# Patient Record
Sex: FEMALE | Race: WHITE | NOT HISPANIC OR LATINO | Employment: UNEMPLOYED | URBAN - METROPOLITAN AREA
[De-identification: names, ages, dates, MRNs, and addresses within clinical notes are randomized per-mention and may not be internally consistent; named-entity substitution may affect disease eponyms.]

---

## 2017-02-22 ENCOUNTER — HOSPITAL ENCOUNTER (EMERGENCY)
Facility: HOSPITAL | Age: 32
Discharge: HOME | End: 2017-02-22
Attending: EMERGENCY MEDICINE | Admitting: EMERGENCY MEDICINE

## 2017-02-22 VITALS
SYSTOLIC BLOOD PRESSURE: 126 MMHG | HEIGHT: 61 IN | RESPIRATION RATE: 16 BRPM | WEIGHT: 170 LBS | OXYGEN SATURATION: 99 % | TEMPERATURE: 98.5 F | DIASTOLIC BLOOD PRESSURE: 86 MMHG | HEART RATE: 91 BPM | BODY MASS INDEX: 32.1 KG/M2

## 2017-02-22 DIAGNOSIS — L02.01 ABSCESS, EYEBROW: Primary | ICD-10-CM

## 2017-02-22 PROCEDURE — 99283 EMERGENCY DEPT VISIT LOW MDM: CPT

## 2017-02-22 PROCEDURE — 99282 EMERGENCY DEPT VISIT SF MDM: CPT | Performed by: EMERGENCY MEDICINE

## 2017-02-22 RX ORDER — CLINDAMYCIN HYDROCHLORIDE 150 MG/1
300 CAPSULE ORAL ONCE
Status: COMPLETED | OUTPATIENT
Start: 2017-02-22 | End: 2017-02-22

## 2017-02-22 RX ORDER — CLINDAMYCIN HYDROCHLORIDE 300 MG/1
300 CAPSULE ORAL 3 TIMES DAILY
Qty: 21 CAPSULE | Refills: 0 | Status: SHIPPED | OUTPATIENT
Start: 2017-02-22 | End: 2017-03-01

## 2017-02-22 RX ADMIN — CLINDAMYCIN HYDROCHLORIDE 300 MG: 150 CAPSULE ORAL at 20:03

## 2017-02-23 NOTE — ED PROVIDER NOTES
"Subjective   History of Present Illness  History of Present Illness    Chief complaint: Eyebrow swelling, pain    Location: Right eyebrow    Quality/Severity:  Moderate pain and swelling    Timing/Duration: Began earlier this morning, worsening today    Modifying Factors: None    Narrative: Patient presents for evaluation of right eyebrow pain and swelling with some change in her vision to the right eye.  She denies any fevers.  She says that the swelling and tenderness began this morning and have progressed today.  She notes that she does groom her eyebrows with occasional blocking.  She has not had any swelling or facial infections like this in the past.  She and her family members were referred that it might be a \"cyst.\"  They have not taken any medications for the symptoms so far.  She says there is no pain within the eyeball itself.    Associated Symptoms: As above    Review of Systems   Constitutional: Negative for activity change and fever.   HENT: Negative.    Eyes: Positive for visual disturbance. Negative for pain.   Respiratory: Negative for cough and shortness of breath.    Cardiovascular: Negative for chest pain.   Gastrointestinal: Negative for abdominal pain.   Genitourinary: Negative for dysuria.   Skin: Positive for color change (above the right eye only).   Neurological: Negative for syncope and headaches.   All other systems reviewed and are negative.      Past Medical History   Diagnosis Date   • Anxiety    • Chronic back pain    • Depression        Allergies   Allergen Reactions   • Nickel    • Vyvanse [Lisdexamfetamine Dimesylate]        Past Surgical History   Procedure Laterality Date   • Tonsillectomy     • Adenoidectomy     • [place] tooth extraction     • Lumbar epidural injection     • Knee surgery         No family history on file.    Social History     Social History   • Marital status:      Spouse name: N/A   • Number of children: N/A   • Years of education: N/A     Social " History Main Topics   • Smoking status: Former Smoker   • Smokeless tobacco: None      Comment: pt states she uses e-cigs/vapor   • Alcohol use 1.2 oz/week     2 Glasses of wine per week   • Drug use: Yes     Special: Marijuana      Comment: pt states last smoke marijuana several weeks ago   • Sexual activity: Defer     Other Topics Concern   • None     Social History Narrative       ED Triage Vitals   Temp Heart Rate Resp BP SpO2   02/22/17 1811 02/22/17 1811 02/22/17 1811 02/22/17 1811 02/22/17 1811   98.5 °F (36.9 °C) 91 16 126/86 99 %      Temp src Heart Rate Source Patient Position BP Location FiO2 (%)   -- -- -- -- --                Objective   Physical Exam   Constitutional: She is oriented to person, place, and time. She appears well-developed and well-nourished.   HENT:   Head: Normocephalic and atraumatic.   Eyes: EOM are normal. Pupils are equal, round, and reactive to light. Right eye exhibits no discharge. Left eye exhibits no discharge.   The right mid eyebrow shows an early abscess formation with induration and erythema approximately 2 cm wide and very tender to palpation.  There are no open lesions.  There is no drainage.  The eye itself is completely normal in appearance without any inflammatory changes.  Extracting muscles are entirely intact in all directions.  Visual acuities appears normal bilaterally.   Neck: Normal range of motion. Neck supple.   Cardiovascular: Normal rate and regular rhythm.    Pulmonary/Chest: Effort normal. No respiratory distress.   Musculoskeletal: Normal range of motion. She exhibits no edema or deformity.   Neurological: She is alert and oriented to person, place, and time.   Skin: Skin is warm and dry. No rash noted. No erythema.   Psychiatric: She has a normal mood and affect. Her behavior is normal. Judgment and thought content normal.   Nursing note and vitals reviewed.      Procedures         ED Course  ED Course                  MDM    Final diagnoses:   Abscess,  boo Greene MD  02/25/17 0811

## 2017-02-23 NOTE — DISCHARGE INSTRUCTIONS
Please return to the emergency room for any worsening pain, fevers, swelling, drainage, vision changes or any other concerns.

## 2017-03-29 ENCOUNTER — APPOINTMENT (OUTPATIENT)
Dept: GENERAL RADIOLOGY | Facility: HOSPITAL | Age: 32
End: 2017-03-29

## 2017-03-29 ENCOUNTER — HOSPITAL ENCOUNTER (EMERGENCY)
Facility: HOSPITAL | Age: 32
Discharge: HOME | End: 2017-03-29
Attending: EMERGENCY MEDICINE | Admitting: EMERGENCY MEDICINE

## 2017-03-29 VITALS
HEART RATE: 90 BPM | SYSTOLIC BLOOD PRESSURE: 134 MMHG | TEMPERATURE: 98.1 F | HEIGHT: 62 IN | OXYGEN SATURATION: 100 % | RESPIRATION RATE: 18 BRPM | DIASTOLIC BLOOD PRESSURE: 87 MMHG | WEIGHT: 190 LBS | BODY MASS INDEX: 34.96 KG/M2

## 2017-03-29 DIAGNOSIS — M25.561 ACUTE PAIN OF BOTH KNEES: Primary | ICD-10-CM

## 2017-03-29 DIAGNOSIS — M25.562 ACUTE PAIN OF BOTH KNEES: Primary | ICD-10-CM

## 2017-03-29 PROCEDURE — 73560 X-RAY EXAM OF KNEE 1 OR 2: CPT

## 2017-03-29 PROCEDURE — 99282 EMERGENCY DEPT VISIT SF MDM: CPT | Performed by: EMERGENCY MEDICINE

## 2017-03-29 PROCEDURE — 99283 EMERGENCY DEPT VISIT LOW MDM: CPT

## 2017-03-29 RX ORDER — NAPROXEN 500 MG/1
500 TABLET ORAL 2 TIMES DAILY PRN
Qty: 20 TABLET | Refills: 0 | Status: SHIPPED | OUTPATIENT
Start: 2017-03-29 | End: 2021-03-08

## 2017-03-29 RX ORDER — GABAPENTIN 800 MG/1
800 TABLET ORAL 3 TIMES DAILY
COMMUNITY
End: 2021-03-08
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
## 2017-03-29 NOTE — DISCHARGE INSTRUCTIONS
Rest, ice, elevate your knees as needed.  Please return to the emergency room for any worsening pain, swelling, weakness, numbness or any other concerns.

## 2021-01-19 ENCOUNTER — TELEPHONE (OUTPATIENT)
Dept: INTERNAL MEDICINE | Facility: CLINIC | Age: 36
End: 2021-01-19

## 2021-01-19 DIAGNOSIS — B85.2 LICE: Primary | ICD-10-CM

## 2021-01-19 RX ORDER — SPINOSAD 9 MG/ML
SUSPENSION TOPICAL
Qty: 120 ML | Refills: 1 | Status: SHIPPED | OUTPATIENT
Start: 2021-01-19 | End: 2021-03-08

## 2021-01-19 NOTE — TELEPHONE ENCOUNTER
PT HAS CALLED IN TO SCHEDULE A NEW PATIENT APPOINTMENT WITH DR VALENTINE BUT CANT BE SEEN UNTIL 02/16/2021 WHICH IS OK WITH THE PT BUT HER DAUGHTER HAS LICE SO NOW THE PT HAS IT TOO AND WANTS TO KNOW IF A PRESCRIPTION CAN BE WRITTEN FOR HER.  THE DAUGHTER HAS A PEDIATRICIAN AND HAS WRITTEN HER SOMETHING BUT WONT WRITE IT FOR THE PT.      PT CALL BACK  [phone]  PHARMACY  [facility]  [address]   [id number] [phone]

## 2021-03-08 ENCOUNTER — OFFICE VISIT (OUTPATIENT)
Dept: INTERNAL MEDICINE | Facility: CLINIC | Age: 36
End: 2021-03-08

## 2021-03-08 VITALS
HEART RATE: 111 BPM | TEMPERATURE: 97.5 F | SYSTOLIC BLOOD PRESSURE: 120 MMHG | WEIGHT: 142.6 LBS | OXYGEN SATURATION: 99 % | HEIGHT: 63 IN | DIASTOLIC BLOOD PRESSURE: 72 MMHG | BODY MASS INDEX: 25.27 KG/M2

## 2021-03-08 DIAGNOSIS — G47.00 INSOMNIA, UNSPECIFIED TYPE: ICD-10-CM

## 2021-03-08 DIAGNOSIS — Z00.00 ANNUAL PHYSICAL EXAM: Primary | ICD-10-CM

## 2021-03-08 DIAGNOSIS — B00.9 HERPES INFECTION: ICD-10-CM

## 2021-03-08 DIAGNOSIS — E55.9 AVITAMINOSIS D: ICD-10-CM

## 2021-03-08 DIAGNOSIS — L63.9 ALOPECIA AREATA: ICD-10-CM

## 2021-03-08 DIAGNOSIS — Z12.4 CERVICAL CANCER SCREENING: ICD-10-CM

## 2021-03-08 DIAGNOSIS — G43.109 MIGRAINE WITH AURA AND WITHOUT STATUS MIGRAINOSUS, NOT INTRACTABLE: ICD-10-CM

## 2021-03-08 DIAGNOSIS — Z13.220 SCREENING, LIPID: ICD-10-CM

## 2021-03-08 DIAGNOSIS — F32.81 PREMENSTRUAL DYSPHORIC DISORDER: ICD-10-CM

## 2021-03-08 DIAGNOSIS — Z98.890 S/P BONE GRAFT: ICD-10-CM

## 2021-03-08 PROBLEM — R51.9 CEPHALALGIA: Status: RESOLVED | Noted: 2021-03-08 | Resolved: 2021-03-08

## 2021-03-08 PROBLEM — L74.519 EXCESSIVE SWEATING, LOCAL: Status: RESOLVED | Noted: 2021-03-08 | Resolved: 2021-03-08

## 2021-03-08 PROBLEM — IMO0001 BRASH: Status: ACTIVE | Noted: 2021-03-08

## 2021-03-08 PROBLEM — L74.519 EXCESSIVE SWEATING, LOCAL: Status: ACTIVE | Noted: 2021-03-08

## 2021-03-08 PROBLEM — F41.9 ANXIETY: Status: ACTIVE | Noted: 2021-03-08

## 2021-03-08 PROBLEM — R51.9 CEPHALALGIA: Status: ACTIVE | Noted: 2021-03-08

## 2021-03-08 PROBLEM — A60.00 HERPES SIMPLEX INFECTION OF GENITOURINARY SYSTEM: Status: ACTIVE | Noted: 2021-03-08

## 2021-03-08 PROCEDURE — 99385 PREV VISIT NEW AGE 18-39: CPT | Performed by: FAMILY MEDICINE

## 2021-03-08 RX ORDER — AMITRIPTYLINE HYDROCHLORIDE 10 MG/1
10 TABLET, FILM COATED ORAL NIGHTLY PRN
Qty: 60 TABLET | Refills: 1 | Status: SHIPPED | OUTPATIENT
Start: 2021-03-08 | End: 2021-05-03 | Stop reason: SDUPTHER

## 2021-03-08 RX ORDER — SUMATRIPTAN 50 MG/1
TABLET, FILM COATED ORAL
Qty: 12 TABLET | Refills: 3 | Status: SHIPPED | OUTPATIENT
Start: 2021-03-08

## 2021-03-08 RX ORDER — MULTIPLE VITAMINS W/ MINERALS TAB 9MG-400MCG
1 TAB ORAL DAILY
COMMUNITY
End: 2021-12-17

## 2021-03-08 RX ORDER — VALACYCLOVIR HYDROCHLORIDE 500 MG/1
TABLET, FILM COATED ORAL
COMMUNITY
Start: 2020-12-26 | End: 2021-03-08 | Stop reason: SDUPTHER

## 2021-03-08 RX ORDER — MULTIVIT WITH MINERALS/LUTEIN
250 TABLET ORAL DAILY
COMMUNITY
End: 2021-12-17

## 2021-03-08 RX ORDER — CHOLECALCIFEROL (VITAMIN D3) 1250 MCG
CAPSULE ORAL
COMMUNITY
Start: 2015-04-28 | End: 2021-03-08

## 2021-03-08 RX ORDER — SUMATRIPTAN 100 MG/1
TABLET, FILM COATED ORAL
COMMUNITY
Start: 2015-05-20 | End: 2021-03-08

## 2021-03-08 RX ORDER — PROMETHAZINE HYDROCHLORIDE 12.5 MG/1
12.5 TABLET ORAL EVERY 6 HOURS PRN
Qty: 15 TABLET | Refills: 1 | Status: SHIPPED | OUTPATIENT
Start: 2021-03-08 | End: 2021-12-17

## 2021-03-08 RX ORDER — VALACYCLOVIR HYDROCHLORIDE 500 MG/1
TABLET, FILM COATED ORAL
Qty: 20 TABLET | Refills: 3 | Status: SHIPPED | OUTPATIENT
Start: 2021-03-08 | End: 2021-05-03 | Stop reason: SDUPTHER

## 2021-03-08 NOTE — PROGRESS NOTES
Date of Encounter: 2021  Patient: Siobhan VILLALPANDO,  1985    Subjective   History of Presenting Illness  Chief complaint: Annual physical    Migraine with aura: Only has a handful of migraines per year.  Managed with sumatriptan and promethazine as needed.  Does not know her triggers.    Insomnia: Has problems falling asleep.  Does not nap during the daytime.  Has a sleep regimen that has not been helping.  She has tried Tylenol PM and melatonin without improvement.    Premenstrual dysphoric disorder: Mood disturbances including depression and anxiety associated with her cycle.  Been on hormonal contraception in the past which did not help this problem.  Currently she feels her mood is doing very well.    Herpes infection managed with valacyclovir as needed.    Vitamin D deficiency taking an over-the-counter dose.    Recent bone graft of the left knee for osteochondral defect.    Patient also recently had a significant weight loss with calorie restriction and prescribed hCG.    Lives with , 1 child.  2 dogs and 2 cats.   has had a vasectomy.  Last cervical cancer screening 2017?  Prior 1 pack/week smoker from age [age].  Does not drink alcohol.  No other history of drug use.  Exercises daily on the treadmill and using hand weights.  Very healthy diet with calorie counting.  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  Does not have a living will.  Has not had the Covid vaccine.    Review of Systems:  Negative for fever, congestion, chest pain upon exertion, shortness of breath, vision changes, vomiting, dysuria, lymphadenopathy, muscle weakness, numbness    Positive for mood change and rash      The following portions of the patient's history were reviewed and updated as appropriate: allergies, current medications, past family history, past medical history, past social history, past surgical history and problem list.    Patient Active Problem List   Diagnosis   • ADD (attention deficit  disorder)   • Anxiety   • Avitaminosis D   • Depression   • Migraine with aura and without status migrainosus, not intractable   • Insomnia   • Herpes simplex infection of genitourinary system   • S/P bone graft left knee     Past Medical History:   Diagnosis Date   • ADHD    • Anxiety    • Arthritis    • Bipolar 1 disorder (CMS/HCC)    • Cephalalgia 3/8/2021   • Chronic back pain    • Depression    • Excessive sweating, local 3/8/2021   • Headache    • PONV (postoperative nausea and vomiting)      Past Surgical History:   Procedure Laterality Date   • ADENOIDECTOMY     • KNEE SURGERY     • LUMBAR EPIDURAL INJECTION     • TONSILLECTOMY     • WISDOM TOOTH EXTRACTION       Family History   Problem Relation Age of Onset   • Depression Mother    • Alcohol abuse Mother    • Depression Father    • Heart disease Father    • Hypertension Father    • Diabetes Father    • Heart disease Paternal Grandfather    • Hypertension Paternal Grandfather        Current Outpatient Medications:   •  B Complex Vitamins (VITAMIN B COMPLEX PO), Take  by mouth., Disp: , Rfl:   •  MAGNESIUM PO, Take  by mouth., Disp: , Rfl:   •  multivitamin with minerals (Hair/Skin/Nails/Biotin) tablet tablet, Take 1 tablet by mouth Daily., Disp: , Rfl:   •  TURMERIC PO, Take  by mouth., Disp: , Rfl:   •  valACYclovir (VALTREX) 500 MG tablet, Take 1 tab twice daily for 5 days as needed, Disp: 20 tablet, Rfl: 3  •  vitamin C (ASCORBIC ACID) 250 MG tablet, Take 250 mg by mouth Daily., Disp: , Rfl:   •  amitriptyline (ELAVIL) 10 MG tablet, Take 1 tablet by mouth At Night As Needed for Sleep., Disp: 60 tablet, Rfl: 1  •  promethazine (PHENERGAN) 12.5 MG tablet, Take 1 tablet by mouth Every 6 (Six) Hours As Needed for Nausea or Vomiting., Disp: 15 tablet, Rfl: 1  •  SUMAtriptan (Imitrex) 50 MG tablet, Take one tablet at onset of headache. May repeat dose one time in 2 hours if headache not relieved., Disp: 12 tablet, Rfl: 3  Allergies   Allergen Reactions   •  "Nickel Itching and Rash     Cheap jewelry   • Vyvanse [Lisdexamfetamine Dimesylate] Itching and Rash     Social History     Tobacco Use   • Smoking status: Former Smoker   • Smokeless tobacco: Never Used   • Tobacco comment: pt states she uses e-cigs/vapor   Substance Use Topics   • Alcohol use: Not Currently   • Drug use: Yes     Types: Marijuana     Comment: pt states last smoke marijuana several weeks ago          Objective   Physical Exam  Vitals:    03/08/21 1623   BP: 120/72   Pulse: 111   Temp: 97.5 °F (36.4 °C)   TempSrc: Temporal   SpO2: 99%   Weight: 64.7 kg (142 lb 9.6 oz)   Height: 160 cm (63\")     Body mass index is 25.26 kg/m².    Constitutional: NAD.  Eyes: EOMI. PERRLA. Normal conjunctiva.  Ear, nose, mouth, throat: No tonsillar exudates or erythema.   Normal nasal mucosa. Normal external ear canals and TMs bilaterally.  Cardiovascular: RRR. No murmurs. No LE edema b/l. Radial pulses 2+ bilaterally.  Pulmonary: CTA b/l. Good effort.  Integumentary: Scattered infrequent folliculitis particularly on shaved lower legs. Hair loss on front of scalp, possibly scarring.  Lymphatic: No anterior cervical lymphadenopathy.  Endocrine: No thyromegaly or palpable thyroid nodules.  Psychiatric: Normal affect. Normal thought content.  Mood described as \"good\".     Assessment/Plan   Assessment and Plan  Pleasant 35-year-old female with migraines, insomnia, premenstrual dysphoric disorder, HSV, history of left knee bone graft, avitaminosis D, among other problems, who presents for the following:    Diagnoses and all orders for this visit:    1. Annual physical exam (Primary): We discussed preventative care including age and patient-appropriate immunizations and cancer screening. We also discussed the importance of exercise and a healthy diet. This is their annual preventative exam.  In particular recommend cervical cancer screening, she would prefer a referral to gynecology.  I also discussed COVID-19 " vaccination.    2. Migraine with aura and without status migrainosus, not intractable: Controlled  -     SUMAtriptan (Imitrex) 50 MG tablet; Take one tablet at onset of headache. May repeat dose one time in 2 hours if headache not relieved.  Dispense: 12 tablet; Refill: 3  -     promethazine (PHENERGAN) 12.5 MG tablet; Take 1 tablet by mouth Every 6 (Six) Hours As Needed for Nausea or Vomiting.  Dispense: 15 tablet; Refill: 1  -     CBC & Differential; Future    3. Insomnia, unspecified type: Discussed the risks and benefits of low-dose TCA which also may help intermittent dysphoria and migraines  -     amitriptyline (ELAVIL) 10 MG tablet; Take 1 tablet by mouth At Night As Needed for Sleep.  Dispense: 60 tablet; Refill: 1    4. Premenstrual dysphoric disorder: Did not improve with OCPs.  She has tried multiple antidepressant and antianxiety medications in the past and did not find them beneficial.  For now she feels her mood is doing well so we will continue to monitor…  -     Ambulatory Referral to Obstetrics / Gynecology  -     Comprehensive Metabolic Panel; Future  -     Thyroid Panel With TSH; Future    5. Herpes infection  -     valACYclovir (VALTREX) 500 MG tablet; Take 1 tab twice daily for 5 days as needed  Dispense: 20 tablet; Refill: 3    6. Avitaminosis D  -     Vitamin D 25 Hydroxy; Future    7. S/P bone graft left knee: No current pain    8. Cervical cancer screening: Prefers gynecology for management  -     Ambulatory Referral to Obstetrics / Gynecology    9. Alopecia areata: Clobetasol foam 0.05%    10. Screening, lipid  -     Lipid Panel; Future    Return to office in 1 year for annual physical or earlier as needed.    Suhail Williamson MD  Family Medicine  O: [phone]  C: [phone]    Disclaimer: Parts of this note were dictated by speech recognition. Minor errors in transcription may be present. Please call if questions.

## 2021-03-09 ENCOUNTER — PRIOR AUTHORIZATION (OUTPATIENT)
Dept: INTERNAL MEDICINE | Facility: CLINIC | Age: 36
End: 2021-03-09

## 2021-03-09 NOTE — TELEPHONE ENCOUNTER
PA for Sumatriptan 50mg Tablet approved via [facility].  3/9/2021-3/9/2022

## 2021-03-11 RX ORDER — CLOBETASOL PROPIONATE 0.5 MG/G
AEROSOL, FOAM TOPICAL 2 TIMES DAILY
Qty: 50 G | Refills: 3 | Status: SHIPPED | OUTPATIENT
Start: 2021-03-11 | End: 2021-12-17

## 2021-03-24 ENCOUNTER — PATIENT MESSAGE (OUTPATIENT)
Dept: INTERNAL MEDICINE | Facility: CLINIC | Age: 36
End: 2021-03-24

## 2021-03-24 DIAGNOSIS — E78.49 FAMILIAL HYPERLIPIDEMIA: Primary | ICD-10-CM

## 2021-03-24 RX ORDER — ATORVASTATIN CALCIUM 20 MG/1
TABLET, FILM COATED ORAL
Qty: 90 TABLET | Refills: 1 | Status: SHIPPED | OUTPATIENT
Start: 2021-03-24 | End: 2021-12-17

## 2021-03-24 NOTE — TELEPHONE ENCOUNTER
From: Siobhan VILLALPANDO  To: Suhail Williamson MD  Sent: 3/24/2021 12:30 PM EDT  Subject: Prescription Question    Yes, please send it whatever you recommend for cholesterol. I am shocked it's so high but not surprised because it runs in my family. Thanks

## 2021-03-31 ENCOUNTER — OFFICE VISIT (OUTPATIENT)
Dept: OBSTETRICS AND GYNECOLOGY | Facility: CLINIC | Age: 36
End: 2021-03-31

## 2021-03-31 VITALS
WEIGHT: 143 LBS | DIASTOLIC BLOOD PRESSURE: 68 MMHG | HEIGHT: 61 IN | BODY MASS INDEX: 27 KG/M2 | SYSTOLIC BLOOD PRESSURE: 102 MMHG

## 2021-03-31 DIAGNOSIS — N92.1 MENORRHAGIA WITH IRREGULAR CYCLE: ICD-10-CM

## 2021-03-31 DIAGNOSIS — Z20.2 POSSIBLE EXPOSURE TO STD: ICD-10-CM

## 2021-03-31 DIAGNOSIS — Z01.419 VISIT FOR GYNECOLOGIC EXAMINATION: Primary | ICD-10-CM

## 2021-03-31 PROCEDURE — 99214 OFFICE O/P EST MOD 30 MIN: CPT | Performed by: OBSTETRICS & GYNECOLOGY

## 2021-03-31 PROCEDURE — 99385 PREV VISIT NEW AGE 18-39: CPT | Performed by: OBSTETRICS & GYNECOLOGY

## 2021-03-31 NOTE — PROGRESS NOTES
[facility]  [address], [address]  [place], [place] [address]  Phone: [phone] / Fax:  [phone]      03/31/2021    [address] [address]    Suhail Williamson MD    Chief Complaint   Patient presents with   • Gynecologic Exam     NP Annual Exam, last pap maybe 5 years ago. Patient c/o heavy and painful bleeding. She is also requesting STD screen.       Siobhan VILLALPANDO is here for annual gynecologic exam.  HPI - Patient has not had screening in 5 years.  She was a patient at [facility].  She reports history of normal pap testing.  However, she has a long history of abnormal bleeding.  She has monthly bleeding for the most part with varying degrees of length and heaviness to her cycle.  Some cycles last 2 weeks.  On her heaviest day, she uses 8 to 10 menstrual cups.  She has tried OCP, progesterone and IUD for regulation of cycle without success.  Her  has had a vasectomy.  She requests STD testing.    Past Medical History:   Diagnosis Date   • Abnormal Pap smear of cervix    • ADHD    • Anxiety    • Arthritis    • Bipolar 1 disorder (CMS/[facility])    • Cephalalgia 3/8/2021   • Chronic back pain    • Depression    • Excessive sweating, local 3/8/2021   • Headache    • HPV (human papilloma virus) infection    • PONV (postoperative nausea and vomiting)        Past Surgical History:   Procedure Laterality Date   • ADENOIDECTOMY     • KNEE SURGERY     • LUMBAR EPIDURAL INJECTION     • TONSILLECTOMY     • WISDOM TOOTH EXTRACTION         Allergies   Allergen Reactions   • Nickel Itching and Rash     Cheap jewelry   • Vyvanse [Lisdexamfetamine Dimesylate] Itching and Rash       Social History     Socioeconomic History   • Marital status:      Spouse name: Not on file   • Number of children: Not on file   • Years of education: Not on file   • Highest education level: Not on file   Tobacco Use   • Smoking status: Former Smoker   • Smokeless tobacco: Never Used   • Tobacco  "comment: pt states she uses e-cigs/vapor   Substance and Sexual Activity   • Alcohol use: Not Currently   • Drug use: Yes     Types: Marijuana     Comment: pt states last smoke marijuana several weeks ago   • Sexual activity: Not Currently     Partners: Male     Birth control/protection: Other     Comment: vasectomy       Family History   Problem Relation Age of Onset   • Depression Mother    • Alcohol abuse Mother    • Depression Father    • Heart disease Father    • Hypertension Father    • Diabetes Father    • Heart disease Paternal Grandfather    • Hypertension Paternal Grandfather    • Breast cancer Maternal Grandmother        Patient's last menstrual period was 2021 (approximate).    OB History        2    Para   1    Term                AB   1    Living           SAB        TAB        Ectopic   1    Molar        Multiple        Live Births                    Vitals:    21 1359   BP: 102/68   Weight: 64.9 kg (143 lb)   Height: 154.9 cm (61\")       Physical Exam  Constitutional:       Appearance: She is well-developed.   Genitourinary:      Pelvic exam was performed with patient supine.      Vagina and uterus normal.      No cervical motion tenderness or lesion.      Right adnexa not tender or full.      Left adnexa not tender or full.   HENT:      Right Ear: External ear normal.      Left Ear: External ear normal.      Nose: Nose normal.   Eyes:      Conjunctiva/sclera: Conjunctivae normal.   Neck:      Thyroid: No thyromegaly.   Cardiovascular:      Rate and Rhythm: Normal rate and regular rhythm.      Heart sounds: Normal heart sounds.   Pulmonary:      Effort: Pulmonary effort is normal.      Breath sounds: No stridor. No wheezing.   Chest:      Breasts:         Right: No mass or nipple discharge.         Left: No mass or nipple discharge.   Abdominal:      Palpations: Abdomen is soft. There is no mass.      Tenderness: There is no guarding or rebound.   Musculoskeletal:         " General: Normal range of motion.      Cervical back: Normal range of motion and neck supple.   Neurological:      Mental Status: She is alert.      Coordination: Coordination normal.   Skin:     General: Skin is warm and dry.      Comments: Extensive body tattooing   Psychiatric:         Mood and Affect: Mood normal.         Behavior: Behavior normal.         Thought Content: Thought content normal.         Judgment: Judgment normal.   Vitals reviewed.         Diagnoses and all orders for this visit:    1. Visit for gynecologic examination (Primary)  -     IgP, Aptima HPV  -     Discussed intent of regular screening.  Pap testing and clinical breast exam performed.  Patient quit smoking over a year ago.    2. Menorrhagia with irregular cycle  -     CBC (No Diff)  -     TSH Rfx On Abnormal To Free T4  -     Prolactin  -     Follicle Stimulating Hormone  -     US Non-ob Transvaginal; Future  -     Patient requesting surgical intervention.  First step is to obtain records and work up; there has been no recent work up for bleeding.  Follow up when completed.    3. Possible exposure to STD  -     HIV-1 / O / 2 Ag / Antibody 4th Generation  -     RPR  -     Chlamydia trachomatis, Neisseria gonorrhoeae, Trichomonas vaginalis, PCR - Swab, Vagina        Guerrero Chou MD

## 2021-04-02 LAB
CYTOLOGIST CVX/VAG CYTO: NORMAL
CYTOLOGY CVX/VAG DOC CYTO: NORMAL
CYTOLOGY CVX/VAG DOC THIN PREP: NORMAL
DX ICD CODE: NORMAL
HIV 1 & 2 AB SER-IMP: NORMAL
HPV I/H RISK 4 DNA CVX QL PROBE+SIG AMP: NEGATIVE
OTHER STN SPEC: NORMAL
STAT OF ADQ CVX/VAG CYTO-IMP: NORMAL

## 2021-04-03 LAB
C TRACH RRNA SPEC QL NAA+PROBE: NEGATIVE
N GONORRHOEA RRNA SPEC QL NAA+PROBE: NEGATIVE
T VAGINALIS DNA SPEC QL NAA+PROBE: NEGATIVE

## 2021-04-05 ENCOUNTER — TELEPHONE (OUTPATIENT)
Dept: OBSTETRICS AND GYNECOLOGY | Facility: CLINIC | Age: 36
End: 2021-04-05

## 2021-04-06 NOTE — TELEPHONE ENCOUNTER
Patient is aware, and said she most likely will be unable to come in this week for blood work since she does not have . Informed her that if she is able to make it in sometime this week that would be best.     Thank you

## 2021-04-06 NOTE — TELEPHONE ENCOUNTER
Patient is aware and plans to do blood work when she comes in for her U/S on the 12th of April.    Thank you

## 2021-04-06 NOTE — TELEPHONE ENCOUNTER
Lakesha    I would like her to do blood work before ultrasound.  If she does her blood work the same day as ultrasound, I will not have results back.    Thanks    José Antonio

## 2021-04-06 NOTE — TELEPHONE ENCOUNTER
Lakesha    Let her know that her pap and cultures were negative.  Find out if she is planning on following up by doing blood work.     Thanks    José Antonio

## 2021-04-13 ENCOUNTER — TELEPHONE (OUTPATIENT)
Dept: OBSTETRICS AND GYNECOLOGY | Facility: CLINIC | Age: 36
End: 2021-04-13

## 2021-04-13 LAB
ERYTHROCYTE [DISTWIDTH] IN BLOOD BY AUTOMATED COUNT: 12.4 % (ref 12.3–15.4)
FSH SERPL-ACNC: 8.3 MIU/ML
HCT VFR BLD AUTO: 42.6 % (ref 34–46.6)
HGB BLD-MCNC: 13.9 G/DL (ref 12–15.9)
MCH RBC QN AUTO: 31.4 PG (ref 26.6–33)
MCHC RBC AUTO-ENTMCNC: 32.6 G/DL (ref 31.5–35.7)
MCV RBC AUTO: 96.2 FL (ref 79–97)
PLATELET # BLD AUTO: 302 10*3/MM3 (ref 140–450)
PROLACTIN SERPL-MCNC: 8.9 NG/ML (ref 4.8–23.3)
RBC # BLD AUTO: 4.43 10*6/MM3 (ref 3.77–5.28)
TSH SERPL DL<=0.005 MIU/L-ACNC: 2.07 UIU/ML (ref 0.27–4.2)
WBC # BLD AUTO: 5.88 10*3/MM3 (ref 3.4–10.8)

## 2021-04-13 NOTE — TELEPHONE ENCOUNTER
Rita    Let her know that her tests all looked normal, including blood work and ultrasound.      I would recommend a course of medical therapy to try to improve cycles first, before resorting to surgical intervention.    Thanks    José Antonio

## 2021-04-14 ENCOUNTER — TELEPHONE (OUTPATIENT)
Dept: OBSTETRICS AND GYNECOLOGY | Facility: CLINIC | Age: 36
End: 2021-04-14

## 2021-04-14 ENCOUNTER — PATIENT MESSAGE (OUTPATIENT)
Dept: INTERNAL MEDICINE | Facility: CLINIC | Age: 36
End: 2021-04-14

## 2021-04-14 DIAGNOSIS — B35.0 TINEA CAPITIS: Primary | ICD-10-CM

## 2021-04-14 RX ORDER — ACETAMINOPHEN AND CODEINE PHOSPHATE 120; 12 MG/5ML; MG/5ML
1 SOLUTION ORAL DAILY
Qty: 28 TABLET | Refills: 12 | Status: SHIPPED | OUTPATIENT
Start: 2021-04-14 | End: 2021-05-29 | Stop reason: SDUPTHER

## 2021-04-14 NOTE — TELEPHONE ENCOUNTER
From: Siobhan Coyle  To: Suhail Williamson MD  Sent: 4/14/2021 3:01 PM EDT  Subject: Prescription Question    I have finished the first bottle of foam for my hair loss and I actually have a worse situation now it caused a lot of bumps and made the rash worse on my scalp down onto my foreheads. Plus none of my hair grew back, any other recommendations for me?

## 2021-04-14 NOTE — TELEPHONE ENCOUNTER
Spoke to patient and advised this is still an active order, meaning the labs needed drawn. Patient was up set and wants to know why it was not drawn in addition to her other labs drawn two day ago as well if it was active. Unfortunate I did not have an answer for the patient since the order was there but over looked, I apologized. I explained to her she may return at any time to do so, She expressed aggrevation again is this was an inconvenience and un happy with the  in general. Patient states She will go to her PCP. 4-14-21/LW    ----- Message from Siobhan Coyle sent at 4/14/2021 12:54 PM EDT -----  Regarding: Test Results Question  Contact: [phone]  I'm still waiting on my hiv and syphillis test results, when will I get those?    
fall

## 2021-04-16 ENCOUNTER — BULK ORDERING (OUTPATIENT)
Dept: CASE MANAGEMENT | Facility: OTHER | Age: 36
End: 2021-04-16

## 2021-04-16 DIAGNOSIS — Z23 IMMUNIZATION DUE: ICD-10-CM

## 2021-04-16 RX ORDER — TERBINAFINE HYDROCHLORIDE 250 MG/1
TABLET ORAL
Qty: 14 TABLET | Refills: 1 | Status: SHIPPED | OUTPATIENT
Start: 2021-04-16 | End: 2021-12-17

## 2021-05-03 DIAGNOSIS — B00.9 HERPES INFECTION: ICD-10-CM

## 2021-05-03 DIAGNOSIS — G47.00 INSOMNIA, UNSPECIFIED TYPE: ICD-10-CM

## 2021-05-03 RX ORDER — VALACYCLOVIR HYDROCHLORIDE 500 MG/1
TABLET, FILM COATED ORAL
Qty: 20 TABLET | Refills: 3 | Status: SHIPPED | OUTPATIENT
Start: 2021-05-03 | End: 2022-09-30 | Stop reason: SDUPTHER

## 2021-05-03 RX ORDER — AMITRIPTYLINE HYDROCHLORIDE 10 MG/1
10 TABLET, FILM COATED ORAL NIGHTLY PRN
Qty: 60 TABLET | Refills: 1 | Status: SHIPPED | OUTPATIENT
Start: 2021-05-03 | End: 2021-12-17

## 2021-05-17 ENCOUNTER — PATIENT MESSAGE (OUTPATIENT)
Dept: INTERNAL MEDICINE | Facility: CLINIC | Age: 36
End: 2021-05-17

## 2021-05-17 NOTE — TELEPHONE ENCOUNTER
From: Siobhan Coyle  To: Suhail Williamson MD  Sent: 5/17/2021 1:26 PM EDT  Subject: Non-Urgent Medical Question    My head rash is gone but the balding is still really bad is there anything else we can try? I have some blood work I need drawn for my Gyno, am I able to get that done in your office? it's a [employer] order, if that means anything. One more thing, I got my second vaccine shot last Thursday and so did my best friend. She had ear pain and is now on antibiotics for an ear infection, which her dr says is common side effect for the second shot. I am having ear pain not sure if infection. Sorry for the wall of text

## 2021-06-01 RX ORDER — ACETAMINOPHEN AND CODEINE PHOSPHATE 120; 12 MG/5ML; MG/5ML
1 SOLUTION ORAL DAILY
Qty: 28 TABLET | Refills: 12 | Status: SHIPPED | OUTPATIENT
Start: 2021-06-01 | End: 2021-12-17

## 2021-06-28 ENCOUNTER — TELEPHONE (OUTPATIENT)
Dept: OBSTETRICS AND GYNECOLOGY | Facility: CLINIC | Age: 36
End: 2021-06-28

## 2021-06-28 NOTE — TELEPHONE ENCOUNTER
Period lasted a month last time with a heavy period large clots and a lot of pain. Period just started again and wants to talk to you about next step or surgery

## 2021-06-28 NOTE — TELEPHONE ENCOUNTER
Selene    Please have her scheduled for pre-operative visit with me in the next 2 weeks.    Thanks    José Antonio

## 2021-07-15 ENCOUNTER — OFFICE VISIT (OUTPATIENT)
Dept: OBSTETRICS AND GYNECOLOGY | Facility: CLINIC | Age: 36
End: 2021-07-15

## 2021-07-15 VITALS
DIASTOLIC BLOOD PRESSURE: 61 MMHG | HEIGHT: 61 IN | SYSTOLIC BLOOD PRESSURE: 96 MMHG | BODY MASS INDEX: 25.11 KG/M2 | WEIGHT: 133 LBS

## 2021-07-15 DIAGNOSIS — N92.1 MENORRHAGIA WITH IRREGULAR CYCLE: Primary | ICD-10-CM

## 2021-07-15 DIAGNOSIS — R10.2 PELVIC PAIN: ICD-10-CM

## 2021-07-15 DIAGNOSIS — Z11.3 SCREENING FOR STD (SEXUALLY TRANSMITTED DISEASE): ICD-10-CM

## 2021-07-15 PROCEDURE — 99214 OFFICE O/P EST MOD 30 MIN: CPT | Performed by: OBSTETRICS & GYNECOLOGY

## 2021-07-15 NOTE — PROGRESS NOTES
"Subjective   Siobhan Coyle is a 36 y.o. female.     Cc:  Bleeding, pain    History of Present Illness - 36 year old parous times one who presents for consultation.  She was seen 3 to 4 months ago with complaints of bleeding refractory to multiple treatment options that were arranged by other providers in past.  She underwent a \"new\" work up including labs and sonogram, all of which were unremarkable.  She was begun on Progestin therapy.  However, patient reports that this isn't work and presented today for options.  Today, patient reports possible issues with endometriosis, though she has not had this diagnosed formally with biopsy, based on research on the web/internet.  She reports pain with intercourse, painful periods, pain in upper abdomen and near \"lungs\" as well as \"hiccups\" all of which she feels might be related to endometriosis.  She has been in pain-management in past.    The following portions of the patient's history were reviewed and updated as appropriate:   She  has a past medical history of Abnormal Pap smear of cervix, ADHD, Anxiety, Arthritis, Bipolar 1 disorder (CMS/[facility]), Cephalalgia (3/8/2021), Chronic back pain, Depression, Excessive sweating, local (3/8/2021), Headache, HPV (human papilloma virus) infection, and PONV (postoperative nausea and vomiting).  She  reports that she has quit smoking. She uses smokeless tobacco. She reports previous alcohol use. She reports current drug use. Drug: Marijuana.  Current Outpatient Medications   Medication Sig Dispense Refill   • atorvastatin (LIPITOR) 20 MG tablet Take 1 tab PO QD for cholesterol and heart health 90 tablet 1   • promethazine (PHENERGAN) 12.5 MG tablet Take 1 tablet by mouth Every 6 (Six) Hours As Needed for Nausea or Vomiting. 15 tablet 1   • SUMAtriptan (Imitrex) 50 MG tablet Take one tablet at onset of headache. May repeat dose one time in 2 hours if headache not relieved. 12 tablet 3   • valACYclovir (VALTREX) 500 MG tablet Take 1 " tab twice daily for 5 days as needed 20 tablet 3   • amitriptyline (ELAVIL) 10 MG tablet Take 1 tablet by mouth At Night As Needed for Sleep. 60 tablet 1   • B Complex Vitamins (VITAMIN B COMPLEX PO) Take  by mouth.     • clobetasol (OLUX) 0.05 % topical foam Apply  topically to the appropriate area as directed 2 (Two) Times a Day. 50 g 3   • MAGNESIUM PO Take  by mouth.     • multivitamin with minerals (Hair/Skin/Nails/Biotin) tablet tablet Take 1 tablet by mouth Daily.     • norethindrone (MICRONOR) 0.35 MG tablet Take 1 tablet by mouth Daily. 28 tablet 12   • terbinafine (lamiSIL) 250 MG tablet Take 1 tablet daily for 2 weeks. If improving but not completely, may fill for another two weeks. 14 tablet 1   • TURMERIC PO Take  by mouth.     • vitamin C (ASCORBIC ACID) 250 MG tablet Take 250 mg by mouth Daily.     • vitamin D3 125 MCG (5000 UT) capsule capsule Take 5,000 Units by mouth Daily.       No current facility-administered medications for this visit.     She is allergic to nickel and vyvanse [lisdexamfetamine dimesylate]..    Review of Systems   Genitourinary: Positive for menstrual problem and pelvic pain.       Objective   Physical Exam  Vitals reviewed. Exam conducted with a chaperone present.   Constitutional:       Appearance: Normal appearance.   Abdominal:      General: Abdomen is flat.      Palpations: Abdomen is soft.      Tenderness: There is no abdominal tenderness. There is no guarding or rebound.   Genitourinary:     General: Normal vulva.      Exam position: Lithotomy position.      Labia:         Right: No rash or tenderness.         Left: No rash or tenderness.       Vagina: Normal.      Cervix: Normal.      Uterus: Normal.       Adnexa: Right adnexa normal and left adnexa normal.   Neurological:      Mental Status: She is alert.   Psychiatric:         Mood and Affect: Mood normal.         Behavior: Behavior normal.         Thought Content: Thought content normal.         Judgment: Judgment  normal.         Assessment/Plan   Diagnoses and all orders for this visit:    1. Menorrhagia with irregular cycle/Pelvic Pain  -     CBC (No Diff)  -      There is no etiology for patient's symptoms at present; however, adenomyosis is likeliest diagnosis.  Endometriosis is less likely and unlikely to be severe given normal findings on testing and normal pelvic examination.  Reviewed that patient is not menopausal.  At this point, I offered patient hysterectomy and emphasized that it will definitely eliminate menses and heavy bleeding; however, hysterectomy may not control pain issues and certainly unlikely to improve other somatic issues.  I discussed laparoscopic hysterectomy, reviewed that it is outpatient and that ovaries should be preserved.  Discussed risks of surgery including bleeding, infection, injury to internal organs and anesthesia.  Patient voices understanding.  She will discuss with family members.  Pamphlet given.    2. Screening for STD (sexually transmitted disease)  -     HIV-1 / O / 2 Ag / Antibody 4th Generation  -     RPR  -     Await testing.         Guerrero Chou MD

## 2021-07-16 LAB
ERYTHROCYTE [DISTWIDTH] IN BLOOD BY AUTOMATED COUNT: 12.5 % (ref 12.3–15.4)
HCT VFR BLD AUTO: 39.7 % (ref 34–46.6)
HGB BLD-MCNC: 13.4 G/DL (ref 12–15.9)
HIV 1+2 AB+HIV1 P24 AG SERPL QL IA: NON REACTIVE
MCH RBC QN AUTO: 30.6 PG (ref 26.6–33)
MCHC RBC AUTO-ENTMCNC: 33.8 G/DL (ref 31.5–35.7)
MCV RBC AUTO: 90.6 FL (ref 79–97)
PLATELET # BLD AUTO: 288 10*3/MM3 (ref 140–450)
RBC # BLD AUTO: 4.38 10*6/MM3 (ref 3.77–5.28)
RPR SER QL: NORMAL
WBC # BLD AUTO: 6.33 10*3/MM3 (ref 3.4–10.8)

## 2021-07-17 ENCOUNTER — TELEPHONE (OUTPATIENT)
Dept: OBSTETRICS AND GYNECOLOGY | Facility: CLINIC | Age: 36
End: 2021-07-17

## 2021-11-10 ENCOUNTER — TELEPHONE (OUTPATIENT)
Dept: INTERNAL MEDICINE | Facility: CLINIC | Age: 36
End: 2021-11-10

## 2021-11-10 NOTE — TELEPHONE ENCOUNTER
PT SCHEDULED TO GET HER CHOLESTEROL CHECKED AFTER 6MO. PT STATES IT HAS BEEN OVER 6MO. NEEDS ORDERS MADE. PT WILL BE HERE FOR LABS AT 8:30AM Thursday 11/11/21 AND SHE WILL BE NPO

## 2021-11-11 ENCOUNTER — LAB (OUTPATIENT)
Dept: INTERNAL MEDICINE | Facility: CLINIC | Age: 36
End: 2021-11-11

## 2021-11-11 DIAGNOSIS — E78.49 FAMILIAL HYPERLIPIDEMIA: Primary | ICD-10-CM

## 2021-11-12 LAB
ALBUMIN SERPL-MCNC: 4.7 G/DL (ref 3.8–4.8)
ALBUMIN/GLOB SERPL: 2.2 {RATIO} (ref 1.2–2.2)
ALP SERPL-CCNC: 59 IU/L (ref 44–121)
ALT SERPL-CCNC: 14 IU/L (ref 0–32)
AST SERPL-CCNC: 24 IU/L (ref 0–40)
BILIRUB SERPL-MCNC: 0.6 MG/DL (ref 0–1.2)
BUN SERPL-MCNC: 10 MG/DL (ref 6–20)
BUN/CREAT SERPL: 12 (ref 9–23)
CALCIUM SERPL-MCNC: 9.4 MG/DL (ref 8.7–10.2)
CHLORIDE SERPL-SCNC: 101 MMOL/L (ref 96–106)
CHOLEST SERPL-MCNC: 233 MG/DL (ref 100–199)
CO2 SERPL-SCNC: 23 MMOL/L (ref 20–29)
CREAT SERPL-MCNC: 0.82 MG/DL (ref 0.57–1)
GLOBULIN SER CALC-MCNC: 2.1 G/DL (ref 1.5–4.5)
GLUCOSE SERPL-MCNC: 66 MG/DL (ref 65–99)
HDLC SERPL-MCNC: 110 MG/DL
LDLC SERPL CALC-MCNC: 113 MG/DL (ref 0–99)
POTASSIUM SERPL-SCNC: 4.1 MMOL/L (ref 3.5–5.2)
PROT SERPL-MCNC: 6.8 G/DL (ref 6–8.5)
SODIUM SERPL-SCNC: 140 MMOL/L (ref 134–144)
TRIGL SERPL-MCNC: 57 MG/DL (ref 0–149)
VLDLC SERPL CALC-MCNC: 10 MG/DL (ref 5–40)

## 2021-11-12 NOTE — PROGRESS NOTES
Your cholesterol has improved significantly on the cholesterol medication and is now at goal of LDL less than 120.    I have no concerns.  Keep up the good work.

## 2021-11-22 ENCOUNTER — TELEPHONE (OUTPATIENT)
Dept: INTERNAL MEDICINE | Facility: CLINIC | Age: 36
End: 2021-11-22

## 2021-11-22 NOTE — TELEPHONE ENCOUNTER
----- Message from Suhail Williamson MD sent at 11/22/2021  8:26 AM EST -----  Patient has not viewed their [employer] results message. Please call or send a letter to the patient as appropriate with the information in my results note.

## 2021-11-22 NOTE — TELEPHONE ENCOUNTER
----- Message from Suhail Williamson MD sent at 11/22/2021  8:26 AM EST -----  Patient has not viewed their [employer] results message. Please call or send a letter to the patient as appropriate with the information in my results note.

## 2021-12-17 ENCOUNTER — OFFICE VISIT (OUTPATIENT)
Dept: INTERNAL MEDICINE | Facility: CLINIC | Age: 36
End: 2021-12-17

## 2021-12-17 VITALS
SYSTOLIC BLOOD PRESSURE: 124 MMHG | WEIGHT: 131 LBS | OXYGEN SATURATION: 98 % | HEART RATE: 94 BPM | TEMPERATURE: 98.3 F | BODY MASS INDEX: 23.21 KG/M2 | HEIGHT: 63 IN | DIASTOLIC BLOOD PRESSURE: 80 MMHG

## 2021-12-17 DIAGNOSIS — R52 GENERALIZED BODY ACHES: ICD-10-CM

## 2021-12-17 DIAGNOSIS — J02.9 SORE THROAT: Primary | ICD-10-CM

## 2021-12-17 DIAGNOSIS — R05.9 COUGH: ICD-10-CM

## 2021-12-17 LAB
EXPIRATION DATE: NORMAL
EXPIRATION DATE: NORMAL
FLUAV AG NPH QL: NEGATIVE
FLUBV AG NPH QL: NEGATIVE
INTERNAL CONTROL: NORMAL
INTERNAL CONTROL: NORMAL
Lab: 2653
Lab: NORMAL
S PYO AG THROAT QL: NEGATIVE

## 2021-12-17 PROCEDURE — 99213 OFFICE O/P EST LOW 20 MIN: CPT | Performed by: NURSE PRACTITIONER

## 2021-12-17 PROCEDURE — 87880 STREP A ASSAY W/OPTIC: CPT | Performed by: NURSE PRACTITIONER

## 2021-12-17 PROCEDURE — 87804 INFLUENZA ASSAY W/OPTIC: CPT | Performed by: NURSE PRACTITIONER

## 2021-12-17 RX ORDER — BENZONATATE 100 MG/1
100 CAPSULE ORAL 3 TIMES DAILY PRN
Qty: 30 CAPSULE | Refills: 0 | Status: SHIPPED | OUTPATIENT
Start: 2021-12-17

## 2021-12-17 RX ORDER — GUAIFENESIN AND DEXTROMETHORPHAN HYDROBROMIDE 600; 30 MG/1; MG/1
2 TABLET, EXTENDED RELEASE ORAL 2 TIMES DAILY
Qty: 24 TABLET | Refills: 0 | Status: SHIPPED | OUTPATIENT
Start: 2021-12-17 | End: 2021-12-23

## 2021-12-17 NOTE — PROGRESS NOTES
Date of Encounter: 2021  Patient: Siobhan Coyle,  1985    Subjective     Chief complaint: Sore throat, cough, body aches    HPI   Patient states for the last 4 days she has had sore throat, productive cough and body aches.  Patient states overall she does not feel well.  Patient denies any fever, chills, nausea vomiting.  Patient has taken over-the-counter medications but has not helped a lot.  Patient complains of cough that keeps her up at night.  Patient denies being around anybody that she knows to be sick.  Patient denies any shortness of breath.      Review of Systems:  Negative for fever, congestion, chest pain upon exertion, shortness of breath, vision changes, vomiting, dysuria, lymphadenopathy, muscle weakness, numbness, mood changes, rashes.  Positive for cough, body aches, sore throat    The following portions of the patient's history were reviewed and updated as appropriate: allergies, current medications, past family history, past medical history, past social history, past surgical history and problem list.    Patient Active Problem List   Diagnosis   • ADD (attention deficit disorder)   • History of anxiety and depression   • Avitaminosis D   • Migraine with aura and without status migrainosus, not intractable   • Insomnia   • Herpes simplex infection of genitourinary system   • S/P bone graft left knee   • Familial hyperlipidemia     Past Medical History:   Diagnosis Date   • Abnormal Pap smear of cervix    • ADHD    • Anxiety    • Arthritis    • Bipolar 1 disorder (HCC)    • Cephalalgia 3/8/2021   • Chronic back pain    • Depression    • Excessive sweating, local 3/8/2021   • Headache    • HPV (human papilloma virus) infection    • PONV (postoperative nausea and vomiting)      Past Surgical History:   Procedure Laterality Date   • ADENOIDECTOMY     • KNEE SURGERY     • LUMBAR EPIDURAL INJECTION     • TONSILLECTOMY     • WISDOM TOOTH EXTRACTION       Family History   Problem Relation  "Age of Onset   • Depression Mother    • Alcohol abuse Mother    • Depression Father    • Heart disease Father    • Hypertension Father    • Diabetes Father    • Heart disease Paternal Grandfather    • Hypertension Paternal Grandfather    • Breast cancer Maternal Grandmother        Current Outpatient Medications:   •  B Complex Vitamins (VITAMIN B COMPLEX PO), Take  by mouth., Disp: , Rfl:   •  benzonatate (Tessalon Perles) 100 MG capsule, Take 1 capsule by mouth 3 (Three) Times a Day As Needed for Cough., Disp: 30 capsule, Rfl: 0  •  guaifenesin-dextromethorphan (MUCINEX DM)  MG tablet sustained-release 12 hour tablet, Take 2 tablets by mouth 2 (Two) Times a Day for 6 days., Disp: 24 tablet, Rfl: 0  •  SUMAtriptan (Imitrex) 50 MG tablet, Take one tablet at onset of headache. May repeat dose one time in 2 hours if headache not relieved., Disp: 12 tablet, Rfl: 3  •  valACYclovir (VALTREX) 500 MG tablet, Take 1 tab twice daily for 5 days as needed, Disp: 20 tablet, Rfl: 3  Allergies   Allergen Reactions   • Nickel Itching and Rash     Cheap jewelry   • Vyvanse [Lisdexamfetamine Dimesylate] Itching and Rash     Social History     Tobacco Use   • Smoking status: Former Smoker   • Smokeless tobacco: Current User   • Tobacco comment: pt states she uses e-cigs/vapor   Substance Use Topics   • Alcohol use: Not Currently   • Drug use: Yes     Types: Marijuana          Objective   Physical Exam   Vitals:    12/17/21 0928   BP: 124/80   Pulse: 94   Temp: 98.3 °F (36.8 °C)   TempSrc: Oral   SpO2: 98%   Weight: 59.4 kg (131 lb)   Height: 160 cm (63\")     Body mass index is 23.21 kg/m².    Constitutional: NAD.  Eyes: EOMI. PERRLA. Normal conjunctiva.  Ear, nose, mouth, throat: Throat is slightly red..   Inflamed nasal mucosa. Normal external ear canals and TMs bilaterally.  Cardiovascular: RRR. No murmurs.   Pulmonary: CTA b/l. Good effort.  Integumentary: No rashes or wounds on face or upper extremities.  Lymphatic: No " anterior cervical lymphadenopathy.  Psychiatric: Normal affect. Normal thought content.           Assessment/Plan   Assessment and Plan  Pleasant 36-year-old female with history of hyperlipidemia, ADD, anxiety and depression, migraine and others here today for sick visit.    Diagnoses and all orders for this visit:    1. Sore throat (Primary)  -     POCT rapid strep A  -     Beta Strep Culture, Throat - , Throat    2. Cough  -     COVID-19,[facility] ROUTINE, NP/OP SWAB IN TRANSPORT MEDIA OR ESWAB 72 HR TAT - Swab, Nasopharynx  -     benzonatate (Tessalon Perles) 100 MG capsule; Take 1 capsule by mouth 3 (Three) Times a Day As Needed for Cough.  Dispense: 30 capsule; Refill: 0  -     guaifenesin-dextromethorphan (MUCINEX DM)  MG tablet sustained-release 12 hour tablet; Take 2 tablets by mouth 2 (Two) Times a Day for 6 days.  Dispense: 24 tablet; Refill: 0    3. Generalized body aches  -     POCT Influenza A/B         Patient to rest and stay well-hydrated.  Discussed about quarantining for Covid.  If Covid test positive discussed about monoclonal antibody infusion.  Verbalized understanding of and agreed to plan of care.    Return if symptoms worsen or fail to improve.     Maribel Stokes DNP, FNP-C  [facility]  O: [phone]      Please note that portions of this note were completed with a voice recognition program. Please call if questions.

## 2021-12-18 LAB
LABCORP SARS-COV-2, NAA 2 DAY TAT: NORMAL
SARS-COV-2 RNA RESP QL NAA+PROBE: NOT DETECTED

## 2021-12-20 ENCOUNTER — TELEPHONE (OUTPATIENT)
Dept: INTERNAL MEDICINE | Facility: CLINIC | Age: 36
End: 2021-12-20

## 2021-12-20 LAB
Lab: NORMAL
S PYO THROAT QL CULT: NEGATIVE

## 2021-12-20 NOTE — TELEPHONE ENCOUNTER
----- Message from SPENCER Wallace sent at 12/20/2021  8:36 AM EST -----  Call and let patient know that her Covid test was negative.

## 2022-01-04 ENCOUNTER — TELEPHONE (OUTPATIENT)
Dept: INTERNAL MEDICINE | Facility: CLINIC | Age: 37
End: 2022-01-04

## 2022-01-04 DIAGNOSIS — Z11.3 ROUTINE SCREENING FOR STI (SEXUALLY TRANSMITTED INFECTION): Primary | ICD-10-CM

## 2022-01-05 ENCOUNTER — LAB (OUTPATIENT)
Dept: INTERNAL MEDICINE | Facility: CLINIC | Age: 37
End: 2022-01-05

## 2022-01-05 DIAGNOSIS — Z11.3 ROUTINE SCREENING FOR STI (SEXUALLY TRANSMITTED INFECTION): ICD-10-CM

## 2022-01-06 LAB
HIV 1+2 AB+HIV1 P24 AG SERPL QL IA: NON REACTIVE
HSV1 IGG SER IA-ACNC: <0.91 INDEX (ref 0–0.9)
HSV2 IGG SER IA-ACNC: 11.4 INDEX (ref 0–0.9)
RPR SER QL: NON REACTIVE

## 2022-09-30 ENCOUNTER — OFFICE VISIT (OUTPATIENT)
Dept: INTERNAL MEDICINE | Facility: CLINIC | Age: 37
End: 2022-09-30

## 2022-09-30 VITALS
WEIGHT: 140.4 LBS | SYSTOLIC BLOOD PRESSURE: 110 MMHG | DIASTOLIC BLOOD PRESSURE: 70 MMHG | BODY MASS INDEX: 25.83 KG/M2 | HEART RATE: 76 BPM | OXYGEN SATURATION: 98 % | TEMPERATURE: 98 F | HEIGHT: 62 IN

## 2022-09-30 DIAGNOSIS — N89.8 VAGINAL IRRITATION: ICD-10-CM

## 2022-09-30 DIAGNOSIS — Z20.2 STD EXPOSURE: Primary | ICD-10-CM

## 2022-09-30 DIAGNOSIS — B00.9 HERPES INFECTION: ICD-10-CM

## 2022-09-30 LAB
BILIRUB BLD-MCNC: NEGATIVE MG/DL
CLARITY, POC: ABNORMAL
COLOR UR: YELLOW
EXPIRATION DATE: ABNORMAL
GLUCOSE UR STRIP-MCNC: NEGATIVE MG/DL
KETONES UR QL: NEGATIVE
LEUKOCYTE EST, POC: NEGATIVE
Lab: ABNORMAL
NITRITE UR-MCNC: NEGATIVE MG/ML
PH UR: 7 [PH] (ref 5–8)
PROT UR STRIP-MCNC: NEGATIVE MG/DL
RBC # UR STRIP: NEGATIVE /UL
SP GR UR: 1.02 (ref 1–1.03)
UROBILINOGEN UR QL: ABNORMAL

## 2022-09-30 PROCEDURE — 81003 URINALYSIS AUTO W/O SCOPE: CPT | Performed by: NURSE PRACTITIONER

## 2022-09-30 PROCEDURE — 99214 OFFICE O/P EST MOD 30 MIN: CPT | Performed by: NURSE PRACTITIONER

## 2022-09-30 RX ORDER — VALACYCLOVIR HYDROCHLORIDE 500 MG/1
TABLET, FILM COATED ORAL
Qty: 20 TABLET | Refills: 3 | Status: SHIPPED | OUTPATIENT
Start: 2022-09-30

## 2022-09-30 RX ORDER — FLUCONAZOLE 150 MG/1
150 TABLET ORAL ONCE
Qty: 1 TABLET | Refills: 1 | Status: SHIPPED | OUTPATIENT
Start: 2022-09-30 | End: 2022-09-30

## 2022-09-30 NOTE — PROGRESS NOTES
Date of Encounter: 2022  Patient: Siobhan Coyle,  1985    Subjective     Chief complaint: Vaginal itching    HPI   Patient here today due to STD exposure.  Patient states that 1 week ago she had a new partner.  Patient states that she saw a bump on the patient's penis.  States that she used a condom.  Has continued to be sexually active.    Patient states that she has some vaginal itching.  Denies any discharge.  States that she took her friends antibiotics for 3 days earlier this week.  Still having some burning and itching.    Patient started acyclovir about 4 days ago.  Has a history of genital herpes.      Review of Systems:  Negative for fever, congestion, chest pain upon exertion, shortness of breath, vision changes, vomiting,  lymphadenopathy, muscle weakness, numbness, mood changes, rashes.    The following portions of the patient's history were reviewed and updated as appropriate: allergies, current medications, past family history, past medical history, past social history, past surgical history and problem list.    Patient Active Problem List   Diagnosis   • ADD (attention deficit disorder)   • History of anxiety and depression   • Avitaminosis D   • Migraine with aura and without status migrainosus, not intractable   • Insomnia   • Herpes simplex infection of genitourinary system   • S/P bone graft left knee   • Familial hyperlipidemia     Past Medical History:   Diagnosis Date   • Abnormal Pap smear of cervix    • ADHD    • Anxiety    • Arthritis    • Bipolar 1 disorder (HCC)    • Cephalalgia 3/8/2021   • Chronic back pain    • Depression    • Excessive sweating, local 3/8/2021   • Headache    • HPV (human papilloma virus) infection    • PONV (postoperative nausea and vomiting)      Past Surgical History:   Procedure Laterality Date   • ADENOIDECTOMY     • KNEE SURGERY     • LUMBAR EPIDURAL INJECTION     • TONSILLECTOMY     • WISDOM TOOTH EXTRACTION       Family History   Problem Relation  "Age of Onset   • Depression Mother    • Alcohol abuse Mother    • Depression Father    • Heart disease Father    • Hypertension Father    • Diabetes Father    • Heart disease Paternal Grandfather    • Hypertension Paternal Grandfather    • Breast cancer Maternal Grandmother        Current Outpatient Medications:   •  B Complex Vitamins (VITAMIN B COMPLEX PO), Take  by mouth., Disp: , Rfl:   •  SUMAtriptan (Imitrex) 50 MG tablet, Take one tablet at onset of headache. May repeat dose one time in 2 hours if headache not relieved., Disp: 12 tablet, Rfl: 3  •  valACYclovir (VALTREX) 500 MG tablet, Take 1 tab twice daily for 5 days as needed, Disp: 20 tablet, Rfl: 3  •  benzonatate (Tessalon Perles) 100 MG capsule, Take 1 capsule by mouth 3 (Three) Times a Day As Needed for Cough., Disp: 30 capsule, Rfl: 0  •  fluconazole (Diflucan) 150 MG tablet, Take 1 tablet by mouth 1 (One) Time for 1 dose., Disp: 1 tablet, Rfl: 1  Allergies   Allergen Reactions   • Nickel Itching and Rash     Cheap jewelry   • Vyvanse [Lisdexamfetamine Dimesylate] Itching and Rash     Social History     Tobacco Use   • Smoking status: Former Smoker   • Smokeless tobacco: Current User   • Tobacco comment: pt states she uses e-cigs/vapor   Substance Use Topics   • Alcohol use: Not Currently   • Drug use: Yes     Types: Marijuana          Objective   Physical Exam   Vitals:    09/30/22 0930   BP: 110/70   Pulse: 76   Temp: 98 °F (36.7 °C)   TempSrc: Temporal   SpO2: 98%   Weight: 63.7 kg (140 lb 6.4 oz)   Height: 157.5 cm (62\")     Body mass index is 25.68 kg/m².    Constitutional: NAD.  : Deferred, patient preference.  Negative CVA tenderness.  Cardiovascular: RRR.          Assessment & Plan   Assessment and Plan  Pleasant 37-year-old female with herpes simplex infection of genitourinary system, ADD, migraines, insomnia and others here today to discuss the following:    Diagnoses and all orders for this visit:    1. STD exposure (Primary)  -     " Chlamydia trachomatis, Neisseria gonorrhoeae, Trichomonas vaginalis, PCR - Urine, Urine, Clean Catch  -     HIV-1 / O / 2 Ag / Antibody 4th Generation  -     RPR  -     POCT urinalysis dipstick, automated    2. Herpes infection  -     valACYclovir (VALTREX) 500 MG tablet; Take 1 tab twice daily for 5 days as needed  Dispense: 20 tablet; Refill: 3  -     POCT urinalysis dipstick, automated    3. Vaginal irritation  -     fluconazole (Diflucan) 150 MG tablet; Take 1 tablet by mouth 1 (One) Time for 1 dose.  Dispense: 1 tablet; Refill: 1         Discussed with patient about possible diagnoses.  Discussed about medications and side effects.  Answered some questions from the patient.  Patient verbalized understanding of and agreed to plan of care.    Return if symptoms worsen or fail to improve.     Maribel Stokes DNP, FNP-C  [facility]  O: [phone]      Please note that portions of this note were completed with a voice recognition program. Please call if questions.

## 2022-10-03 LAB
C TRACH RRNA SPEC QL NAA+PROBE: NEGATIVE
HIV 1+2 AB+HIV1 P24 AG SERPL QL IA: NON REACTIVE
N GONORRHOEA RRNA SPEC QL NAA+PROBE: NEGATIVE
RPR SER QL: NON REACTIVE
T VAGINALIS RRNA SPEC QL NAA+PROBE: NEGATIVE

## 2023-10-15 ENCOUNTER — APPOINTMENT (OUTPATIENT)
Dept: CT IMAGING | Facility: HOSPITAL | Age: 38
End: 2023-10-15

## 2023-10-15 ENCOUNTER — APPOINTMENT (OUTPATIENT)
Dept: GENERAL RADIOLOGY | Facility: HOSPITAL | Age: 38
End: 2023-10-15

## 2023-10-15 ENCOUNTER — HOSPITAL ENCOUNTER (EMERGENCY)
Facility: HOSPITAL | Age: 38
Discharge: HOME | End: 2023-10-15
Attending: STUDENT IN AN ORGANIZED HEALTH CARE EDUCATION/TRAINING PROGRAM | Admitting: STUDENT IN AN ORGANIZED HEALTH CARE EDUCATION/TRAINING PROGRAM

## 2023-10-15 VITALS
HEART RATE: 90 BPM | RESPIRATION RATE: 18 BRPM | SYSTOLIC BLOOD PRESSURE: 124 MMHG | WEIGHT: 135 LBS | HEIGHT: 62 IN | TEMPERATURE: 98.6 F | DIASTOLIC BLOOD PRESSURE: 73 MMHG | BODY MASS INDEX: 24.84 KG/M2 | OXYGEN SATURATION: 98 %

## 2023-10-15 DIAGNOSIS — S09.90XA INJURY OF HEAD, INITIAL ENCOUNTER: Primary | ICD-10-CM

## 2023-10-15 DIAGNOSIS — S19.9XXA INJURY OF NECK, INITIAL ENCOUNTER: ICD-10-CM

## 2023-10-15 DIAGNOSIS — T74.21XA SEXUAL ASSAULT OF ADULT, INITIAL ENCOUNTER: ICD-10-CM

## 2023-10-15 DIAGNOSIS — M25.532 PAIN IN BOTH WRISTS: ICD-10-CM

## 2023-10-15 DIAGNOSIS — S09.93XA FACIAL INJURY, INITIAL ENCOUNTER: ICD-10-CM

## 2023-10-15 DIAGNOSIS — M25.531 PAIN IN BOTH WRISTS: ICD-10-CM

## 2023-10-15 LAB
ALBUMIN SERPL-MCNC: 4.8 G/DL (ref 3.5–5.2)
ALBUMIN/GLOB SERPL: 2.2 G/DL
ALP SERPL-CCNC: 56 U/L (ref 39–117)
ALT SERPL W P-5'-P-CCNC: 20 U/L (ref 1–33)
ANION GAP SERPL CALCULATED.3IONS-SCNC: 16.9 MMOL/L (ref 5–15)
AST SERPL-CCNC: 23 U/L (ref 1–32)
BASOPHILS # BLD AUTO: 0.05 10*3/MM3 (ref 0–0.2)
BASOPHILS NFR BLD AUTO: 0.5 % (ref 0–1.5)
BILIRUB SERPL-MCNC: 0.5 MG/DL (ref 0–1.2)
BUN SERPL-MCNC: 7 MG/DL (ref 6–20)
BUN/CREAT SERPL: 10.1 (ref 7–25)
CALCIUM SPEC-SCNC: 8.7 MG/DL (ref 8.6–10.5)
CHLORIDE SERPL-SCNC: 102 MMOL/L (ref 98–107)
CO2 SERPL-SCNC: 18.1 MMOL/L (ref 22–29)
CREAT SERPL-MCNC: 0.69 MG/DL (ref 0.57–1)
DEPRECATED RDW RBC AUTO: 45.4 FL (ref 37–54)
EGFRCR SERPLBLD CKD-EPI 2021: 115.5 ML/MIN/1.73
EOSINOPHIL # BLD AUTO: 0.06 10*3/MM3 (ref 0–0.4)
EOSINOPHIL NFR BLD AUTO: 0.6 % (ref 0.3–6.2)
ERYTHROCYTE [DISTWIDTH] IN BLOOD BY AUTOMATED COUNT: 13.1 % (ref 12.3–15.4)
GLOBULIN UR ELPH-MCNC: 2.2 GM/DL
GLUCOSE SERPL-MCNC: 71 MG/DL (ref 65–99)
HCG SERPL QL: NEGATIVE
HCT VFR BLD AUTO: 39.7 % (ref 34–46.6)
HGB BLD-MCNC: 13.2 G/DL (ref 12–15.9)
IMM GRANULOCYTES # BLD AUTO: 0.01 10*3/MM3 (ref 0–0.05)
IMM GRANULOCYTES NFR BLD AUTO: 0.1 % (ref 0–0.5)
LIPASE SERPL-CCNC: 18 U/L (ref 13–60)
LYMPHOCYTES # BLD AUTO: 1.62 10*3/MM3 (ref 0.7–3.1)
LYMPHOCYTES NFR BLD AUTO: 16 % (ref 19.6–45.3)
MCH RBC QN AUTO: 30.7 PG (ref 26.6–33)
MCHC RBC AUTO-ENTMCNC: 33.2 G/DL (ref 31.5–35.7)
MCV RBC AUTO: 92.3 FL (ref 79–97)
MONOCYTES # BLD AUTO: 0.43 10*3/MM3 (ref 0.1–0.9)
MONOCYTES NFR BLD AUTO: 4.3 % (ref 5–12)
NEUTROPHILS NFR BLD AUTO: 7.94 10*3/MM3 (ref 1.7–7)
NEUTROPHILS NFR BLD AUTO: 78.5 % (ref 42.7–76)
PLATELET # BLD AUTO: 266 10*3/MM3 (ref 140–450)
PMV BLD AUTO: 9.4 FL (ref 6–12)
POTASSIUM SERPL-SCNC: 3.6 MMOL/L (ref 3.5–5.2)
PROT SERPL-MCNC: 7 G/DL (ref 6–8.5)
RBC # BLD AUTO: 4.3 10*6/MM3 (ref 3.77–5.28)
SODIUM SERPL-SCNC: 137 MMOL/L (ref 136–145)
WBC NRBC COR # BLD: 10.11 10*3/MM3 (ref 3.4–10.8)

## 2023-10-15 PROCEDURE — 73110 X-RAY EXAM OF WRIST: CPT

## 2023-10-15 PROCEDURE — 70498 CT ANGIOGRAPHY NECK: CPT

## 2023-10-15 PROCEDURE — 83690 ASSAY OF LIPASE: CPT | Performed by: STUDENT IN AN ORGANIZED HEALTH CARE EDUCATION/TRAINING PROGRAM

## 2023-10-15 PROCEDURE — 25010000002 CEFTRIAXONE PER 250 MG: Performed by: STUDENT IN AN ORGANIZED HEALTH CARE EDUCATION/TRAINING PROGRAM

## 2023-10-15 PROCEDURE — 96372 THER/PROPH/DIAG INJ SC/IM: CPT

## 2023-10-15 PROCEDURE — 85025 COMPLETE CBC W/AUTO DIFF WBC: CPT | Performed by: STUDENT IN AN ORGANIZED HEALTH CARE EDUCATION/TRAINING PROGRAM

## 2023-10-15 PROCEDURE — 99285 EMERGENCY DEPT VISIT HI MDM: CPT

## 2023-10-15 PROCEDURE — 70486 CT MAXILLOFACIAL W/O DYE: CPT

## 2023-10-15 PROCEDURE — 25510000001 IOPAMIDOL PER 1 ML: Performed by: STUDENT IN AN ORGANIZED HEALTH CARE EDUCATION/TRAINING PROGRAM

## 2023-10-15 PROCEDURE — 80053 COMPREHEN METABOLIC PANEL: CPT | Performed by: STUDENT IN AN ORGANIZED HEALTH CARE EDUCATION/TRAINING PROGRAM

## 2023-10-15 PROCEDURE — 71046 X-RAY EXAM CHEST 2 VIEWS: CPT

## 2023-10-15 PROCEDURE — 0 LIDOCAINE 1 % SOLUTION 5 ML VIAL: Performed by: STUDENT IN AN ORGANIZED HEALTH CARE EDUCATION/TRAINING PROGRAM

## 2023-10-15 PROCEDURE — 70450 CT HEAD/BRAIN W/O DYE: CPT

## 2023-10-15 PROCEDURE — 84703 CHORIONIC GONADOTROPIN ASSAY: CPT | Performed by: STUDENT IN AN ORGANIZED HEALTH CARE EDUCATION/TRAINING PROGRAM

## 2023-10-15 RX ORDER — LEVONORGESTREL 1.5 MG/1
1.5 TABLET ORAL ONCE
Status: COMPLETED | OUTPATIENT
Start: 2023-10-15 | End: 2023-10-15

## 2023-10-15 RX ORDER — MULTIPLE VITAMINS W/ MINERALS TAB 9MG-400MCG
1 TAB ORAL DAILY
COMMUNITY

## 2023-10-15 RX ORDER — ACETAMINOPHEN 500 MG
1000 TABLET ORAL ONCE
Status: COMPLETED | OUTPATIENT
Start: 2023-10-15 | End: 2023-10-15

## 2023-10-15 RX ORDER — AZITHROMYCIN 250 MG/1
1000 TABLET, FILM COATED ORAL ONCE
Status: COMPLETED | OUTPATIENT
Start: 2023-10-15 | End: 2023-10-15

## 2023-10-15 RX ORDER — METRONIDAZOLE 500 MG/1
2000 TABLET ORAL ONCE
Status: COMPLETED | OUTPATIENT
Start: 2023-10-15 | End: 2023-10-15

## 2023-10-15 RX ADMIN — CEFTRIAXONE SODIUM 500 MG: 500 INJECTION, POWDER, FOR SOLUTION INTRAMUSCULAR; INTRAVENOUS at 14:00

## 2023-10-15 RX ADMIN — METRONIDAZOLE 2000 MG: 500 TABLET, FILM COATED ORAL at 13:59

## 2023-10-15 RX ADMIN — IOPAMIDOL 100 ML: 755 INJECTION, SOLUTION INTRAVENOUS at 11:35

## 2023-10-15 RX ADMIN — ACETAMINOPHEN 1000 MG: 500 TABLET ORAL at 13:59

## 2023-10-15 RX ADMIN — AZITHROMYCIN MONOHYDRATE 1000 MG: 250 TABLET ORAL at 13:59

## 2023-10-15 RX ADMIN — LEVONORGESTREL 1.5 MG: 1.5 TABLET ORAL at 13:59
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
## 2023-10-15 NOTE — ED NOTES
Dispatch calls back, pt can be released and the  will contact the pt in the next day or two.   (2) cough or sneeze

## 2023-10-15 NOTE — ED NOTES
"Had pt undress on top of a sheet in room and had pt place clothes on a sheet on bedside table. In pt room during exam with Dr Yee. Pt reports \"she can't get her phone to work to call her work to tell them she won't be in\" pt gave this RN verbal consent in fron of Dr Yee to notify her work that she is here in our er being treated. Pt works for [employer]. Called [phone] and left message for a manager to call this RN back to notify that them when they call back that pt won't be in today for work  "
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
## 2023-10-15 NOTE — ED NOTES
[facility] center called and [facility] VALLE requested.  Spoke with Steven and she should be here for the exam in about an hour.

## 2023-10-15 NOTE — Clinical Note
JEAN GARCIA  [facility] EMERGENCY DEPARTMENT  [address] [place] [address]  Phone: [phone]    Siobhan Coyle was seen and treated in our emergency department on 10/15/2023.  She may return to work on 10/17/2023.         Thank you for choosing [facility].    Boyd Yee MD